# Patient Record
Sex: FEMALE
[De-identification: names, ages, dates, MRNs, and addresses within clinical notes are randomized per-mention and may not be internally consistent; named-entity substitution may affect disease eponyms.]

---

## 2023-01-19 ENCOUNTER — NURSE TRIAGE (OUTPATIENT)
Dept: OTHER | Facility: CLINIC | Age: 28
End: 2023-01-19

## 2023-01-19 NOTE — TELEPHONE ENCOUNTER
Location of patient: [place]    Subjective: Caller states \"She has a virus. We went to the hospital last night and went through a process that made her vomit. They gave her Phenergan. They gave her a suppository. It has been 3 hours. She can't stop vomiting again. \"     Current Symptoms: cannot keep anything down. Every 10 minutes. Clammy, pale, cold. Weak. Onset: yesterday ; worsening    Associated Symptoms: no blood in emesis. No diarrhea. Pain Severity: Caller attempted to get caller to discuss pain, was unable to get details on specific location or scale. Caller stated she has been having abd cramping and back spasms    Temperature:  did not address due to severity of complaints    What has been tried: Phenergan Q6 hours. <3 hours last dose. LMP: NA Pregnant: NA    Recommended disposition: Call  Now    Care advice provided, patient verbalizes understanding; denies any other questions or concerns; instructed to call back for any new or worsening symptoms. Patient/caller agrees to calling 911    This triage is a result of a call to Candido bunn Nurse. Please do not respond to the triage nurse through this encounter. Any subsequent communication should be directly with the patient.       Reason for Disposition   Shock suspected (e.g., cold/pale/clammy skin, too weak to stand, low BP, rapid pulse)    Protocols used: Vomiting-ADULT-AH